# Patient Record
Sex: MALE | Race: WHITE | URBAN - METROPOLITAN AREA
[De-identification: names, ages, dates, MRNs, and addresses within clinical notes are randomized per-mention and may not be internally consistent; named-entity substitution may affect disease eponyms.]

---

## 2017-10-23 ENCOUNTER — TELEPHONE (OUTPATIENT)
Dept: SURGERY | Age: 72
End: 2017-10-23

## 2017-10-27 ENCOUNTER — INITIAL CONSULT (OUTPATIENT)
Dept: SURGERY | Age: 72
End: 2017-10-27

## 2017-10-27 VITALS
HEART RATE: 64 BPM | BODY MASS INDEX: 31.18 KG/M2 | SYSTOLIC BLOOD PRESSURE: 119 MMHG | DIASTOLIC BLOOD PRESSURE: 75 MMHG | WEIGHT: 243 LBS | HEIGHT: 74 IN

## 2017-10-27 DIAGNOSIS — I83.11 VARICOSE VEINS OF BOTH LOWER EXTREMITIES WITH INFLAMMATION: Primary | ICD-10-CM

## 2017-10-27 DIAGNOSIS — I83.93 VARICOSE VEINS OF BOTH LOWER EXTREMITIES: ICD-10-CM

## 2017-10-27 DIAGNOSIS — I87.323 CHRONIC VENOUS HYPERTENSION (IDIOPATHIC) WITH INFLAMMATION OF BILATERAL LOWER EXTREMITY: ICD-10-CM

## 2017-10-27 DIAGNOSIS — I83.819 VARICOSE VEINS WITH PAIN: ICD-10-CM

## 2017-10-27 DIAGNOSIS — M79.89 LEG SWELLING: ICD-10-CM

## 2017-10-27 DIAGNOSIS — M79.606 LEG PAIN, ANTERIOR, UNSPECIFIED LATERALITY: ICD-10-CM

## 2017-10-27 DIAGNOSIS — I83.12 VARICOSE VEINS OF BOTH LOWER EXTREMITIES WITH INFLAMMATION: Primary | ICD-10-CM

## 2017-10-27 DIAGNOSIS — I87.2 CHRONIC VENOUS INSUFFICIENCY: ICD-10-CM

## 2017-10-27 DIAGNOSIS — I87.323 IDIOPATHIC CHRONIC VENOUS HYPERTENSION OF BOTH LOWER EXTREMITIES WITH INFLAMMATION: ICD-10-CM

## 2017-10-27 PROCEDURE — 99243 OFF/OP CNSLTJ NEW/EST LOW 30: CPT | Performed by: SURGERY

## 2017-10-27 RX ORDER — PYRIDOSTIGMINE BROMIDE 60 MG/1
60 TABLET ORAL 3 TIMES DAILY
COMMUNITY

## 2017-10-27 RX ORDER — WARFARIN SODIUM 10 MG/1
10 TABLET ORAL
COMMUNITY

## 2017-10-27 ASSESSMENT — ENCOUNTER SYMPTOMS
RESPIRATORY NEGATIVE: 1
ALLERGIC/IMMUNOLOGIC NEGATIVE: 1
COLOR CHANGE: 1
GASTROINTESTINAL NEGATIVE: 1

## 2017-10-27 NOTE — PATIENT INSTRUCTIONS
Patient to elevate leg(s) with the ankle at or above the level of the heart as needed to relieve leg pain and swelling. Patient to participate in exercise as tolerated with focus on the leg(s) including, daily walking, repetitive toe pointing, and calve muscle pumping/stretching as tolerated. Patient was instructed to continue wearing compression stockings (20-30 mmHg) on a daily basis, off every night. Pre-determination for the vein closure procedure will be submitted to the insurance company on your behalf.

## 2017-10-27 NOTE — LETTER
[facility] Vascular Surgery  [address] [place] [id number]  Phone: [phone]  Fax: [phone]      October 27, 2017     Patient: Bakari Mcbride   MR Number: [id number]   YOB: 1945   Date of Visit: 10/27/2017       Dear Delmy Erwin :    Thank you for the request for consultation for Edilma Fritz to me. Below are the relevant portions of my assessment and plan of care. Assessment:      1. Varicose veins of both lower extremities with inflammation    2. Leg swelling    3. Leg pain, anterior, unspecified laterality    4. Varicose veins of both lower extremities    5. Idiopathic chronic venous hypertension of both lower extremities with inflammation    6. Chronic venous hypertension (idiopathic) with inflammation of bilateral lower extremity    7. Varicose veins with pain    8. Chronic venous insufficiency      The patient has evidence of severe chronic venous insufficiency right lower extremity and moderate chronic venous insufficiency left lower extremity. The patient complains of progressively worsening severe right lower extremity varicosity pain, swelling and worsening stasis dermatitis despite wearing daily compression support stockings. He underwent venous duplex imaging of his lower extremities on 9/27/17 and I have reviewed the findings of this study which reveal reflux throughout the bilateral great saphenous veins. Plan:       In order to alleviate the patient's symptoms of severe chronic venous insufficiency right lower extremity which is his more affected limb, recommend chemical ablation of the right great saphenous vein and secondary varicosities of the right medial thigh and calf. The patient is to continue his Coumadin without discontinuation. Patient to elevate leg(s) with the ankle at or above the level of the heart as needed to relieve leg pain and swelling.  Patient to participate in exercise as tolerated with focus on the leg(s) including, daily walking, repetitive toe pointing, and calve muscle pumping/stretching as tolerated. Patient was instructed to continue wearing compression stockings (20-30 mmHg) on a daily basis, off every night. Pre-determination for the vein closure procedure will be submitted to the insurance company on your behalf. If you have questions, please do not hesitate to call me. I look forward to following Chris Metzger along with you.     Sincerely,          Ruchi Black MD    CC providers:  No Recipients

## 2017-10-27 NOTE — PROGRESS NOTES
Subjective:      Patient ID: Carolina Gibson is a 74 y.o. male. Chief Complaint   Patient presents with   [facility] Surgical Consult     Patient has been referred by Dr. Page Villela for painful bilateral varicose veins. Patient states the right is worst then then left. He states he has been wearing compression stockings for about 2 years.  Varicose Veins         Leg Pain    Incident onset: The patient has been experiencing increasing bilateral leg discomfort right leg worse than left for the last several years. He was prescribed compression support stockings by Dr. Rica Saab several months ago and his leg pain persist despite daily use of his st. There was no injury mechanism. The pain is present in the left leg and right leg. The pain is at a severity of 7/10. The pain is severe. The pain has been worsening since onset. Pertinent negatives include no loss of sensation or muscle weakness. It is unknown if a foreign body is present. The symptoms are aggravated by weight bearing and movement. He has tried rest and elevation (Compression support stockings) for the symptoms. The treatment provided no relief. Review of Systems   Constitutional: Negative. HENT: Negative. Eyes: Positive for visual disturbance (wears glasses ). Respiratory: Negative. Cardiovascular: Positive for leg swelling. Gastrointestinal: Negative. Endocrine: Negative. Genitourinary: Negative. Musculoskeletal: Negative. Skin: Positive for color change. Allergic/Immunologic: Negative. Neurological: Negative. Hematological: Negative. Psychiatric/Behavioral: Negative. Objective:   Physical Exam   Musculoskeletal: He exhibits edema (1+ right lower extremity swelling). Skin: Rash (Bilateral stasis dermatitis right worse than left) noted. 1 cm in diameter large ropelike varicose veins bilateral medial thighs and calves right worse than left       Assessment:      1.  Varicose veins of both lower extremities with inflammation     2. Leg swelling     3. Leg pain, anterior, unspecified laterality     4. Varicose veins of both lower extremities     5. Idiopathic chronic venous hypertension of both lower extremities with inflammation     6. Chronic venous hypertension (idiopathic) with inflammation of bilateral lower extremity     7. Varicose veins with pain     8. Chronic venous insufficiency       The patient has evidence of severe chronic venous insufficiency right lower extremity and moderate chronic venous insufficiency left lower extremity. The patient complains of progressively worsening severe right lower extremity varicosity pain, swelling and worsening stasis dermatitis despite wearing daily compression support stockings. He underwent venous duplex imaging of his lower extremities on 9/27/17 and I have reviewed the findings of this study which reveal reflux throughout the bilateral great saphenous veins. Plan:       In order to alleviate the patient's symptoms of severe chronic venous insufficiency right lower extremity which is his more affected limb, recommend chemical ablation of the right great saphenous vein and secondary varicosities of the right medial thigh and calf. The patient is to continue his Coumadin without discontinuation. Patient to elevate leg(s) with the ankle at or above the level of the heart as needed to relieve leg pain and swelling. Patient to participate in exercise as tolerated with focus on the leg(s) including, daily walking, repetitive toe pointing, and calve muscle pumping/stretching as tolerated. Patient was instructed to continue wearing compression stockings (20-30 mmHg) on a daily basis, off every night. Pre-determination for the vein closure procedure will be submitted to the insurance company on your behalf.       JATIN Daniel MA am scribing for and in the presence of Yarely Soriano MD on this date of 10/27/17 at 11:29 AM  I Yarely Soriano MD personally performed the services described in this documentation as scribed by the Certified Medical Assistant Essence Guevara in my presence and it is both accurate and complete.

## 2017-11-15 ENCOUNTER — TELEPHONE (OUTPATIENT)
Dept: SURGERY | Age: 72
End: 2017-11-15

## 2017-11-21 ENCOUNTER — TELEPHONE (OUTPATIENT)
Dept: SURGERY | Age: 72
End: 2017-11-21

## 2017-11-21 NOTE — TELEPHONE ENCOUNTER
Spoke with patient and he stated that he wanted to think about it and will call me back after the Holiday weekend and let me know what he decided.

## 2017-12-08 ENCOUNTER — TELEPHONE (OUTPATIENT)
Dept: SURGERY | Age: 72
End: 2017-12-08

## 2017-12-08 NOTE — TELEPHONE ENCOUNTER
Spoke with patient and he stated that he wants to wait until after the 1st of the year for the [facility].  Patient will call when he is ready to resubmit the approval.